# Patient Record
Sex: FEMALE | Race: WHITE
[De-identification: names, ages, dates, MRNs, and addresses within clinical notes are randomized per-mention and may not be internally consistent; named-entity substitution may affect disease eponyms.]

---

## 2018-03-14 ENCOUNTER — HOSPITAL ENCOUNTER (EMERGENCY)
Dept: HOSPITAL 80 - FED | Age: 23
Discharge: HOME | End: 2018-03-14
Payer: COMMERCIAL

## 2018-03-14 VITALS
RESPIRATION RATE: 16 BRPM | SYSTOLIC BLOOD PRESSURE: 105 MMHG | TEMPERATURE: 97.9 F | DIASTOLIC BLOOD PRESSURE: 66 MMHG | OXYGEN SATURATION: 97 % | HEART RATE: 75 BPM

## 2018-03-14 DIAGNOSIS — K52.9: Primary | ICD-10-CM

## 2018-03-14 LAB — PLATELET # BLD: 289 10^3/UL (ref 150–400)

## 2018-03-14 NOTE — EDPHY
H & P


Stated Complaint: bloody diarrhea abd pain x 1 week





- Personal History


LMP (Females 10-55): Extended Cycle BCP/Inj


Current Tetanus/Diphtheria Vaccine: Unsure





- Medical/Surgical History


Hx Asthma: No


Hx Chronic Respiratory Disease: No


Hx Diabetes: No


Hx Cardiac Disease: No


Hx Renal Disease: No


Hx Cirrhosis: No


Hx Alcoholism: No


Hx HIV/AIDS: No


Hx Splenectomy or Spleen Trauma: No


Other PMH: anxiety





- Social History


Smoking Status: Never smoked


Time Seen by Provider: 03/14/18 10:36


HPI/ROS: 





Chief complaint:  Abdominal pain





History of present illness:  This is a 22-year-old female who presents to the 

emergency department for evaluation of abdominal pain.  Patient has had pain 

for the last 10 days.  Pain is primarily in the lower aspect of the abdomen.  

It is been slowly worsening.  She has developed some diarrhea.  It is 

occasionally bloody with bright red blood.  She has had some intermittent 

nausea and vomiting.  She denies precipitating factors.  She denies alleviating 

factors.  She denies other associated signs or symptoms including no fevers no 

urinary symptoms.  She does have an appointment with a gastroenterologist in a 

few weeks.





Review of systems:  A 10 point review of systems was obtained and other than 

described above was negative (Fabricio Morales)





- Physical Exam


Exam: 





General Appearance: Alert, nontoxic.


Eyes: Pupils equal and round no pallor or injection.


ENT, Mouth: Mucous membranes moist.


Respiratory: There are no retractions, lungs are clear to auscultation.


Cardiovascular:  Regular rate and rhythm.


Gastrointestinal:  Bowel sounds are normal.  Abdomen is soft, nondistended.  

There is mild tenderness in the lower quadrants, left greater than right.  No 

peritoneal signs.


Neurological:  Alert and oriented x4.  Strength and sensation intact and 

symmetrical.


Skin: Warm and dry, no rashes.


Musculoskeletal: Neck is supple non tender. Extremities are symmetrical, full 

range of motion.


Psychiatric: Patient is oriented X 3, there is no agitation.


 (Fabricio Morales)


Constitutional: 





 Initial Vital Signs











Temperature (C)  36.6 C   03/14/18 10:07


 


Heart Rate  102 H  03/14/18 10:07


 


Respiratory Rate  20   03/14/18 10:07


 


Blood Pressure  124/87 H  03/14/18 10:07


 


O2 Sat (%)  98   03/14/18 10:07








 











O2 Delivery Mode               Room Air














Allergies/Adverse Reactions: 


 





No Known Allergies Allergy (Unverified 03/14/18 10:04)


 








Home Medications: 














 Medication  Instructions  Recorded


 


Hyoscyamine Oral Liquid  03/14/18


 


Microgestin 21 1.5-30 Tab  03/14/18


 


Ondansetron Odt [Zofran Odt 4 mg 4 mg PO Q4 #10 tab 03/14/18





(*)]  














Medical Decision Making





- Diagnostics


Imaging: Discussed imaging studies w/ On call Radiologist


ED Course/Re-evaluation: 





Patient seen under the supervision of my secondary supervising physician Dr. Radha James.  Patient presents to the emergency department for increasing 

abdominal pain now with occasional bloody stools and nausea and vomiting. On 

presentation she is nontoxic.  Workup ultimately reveals evidence of a colitis.

  I have consulted with on-call gastroenterology, Dr. Hunter.  He does report 

patient is scheduled to see them in their clinic.  He would like to see her 

sooner rather than later and asked that she call the clinic to discuss an 

earlier appointment.  Patient will be discharged home.  Home care is discussed 

including using Zofran for nausea and vomiting.  She is given referral 

information to Dr. Hunter's office.  Strict return precautions are given.  

Patient voiced understanding and agreement with plan. (Fabricio Morales)


Differential Diagnosis: 





Included but not limited to gastritis, gastroenteritis, biliary tract disease, 

pancreatitis, colitis, appendicitis, diverticulitis, pregnancy an associated 

complications (Fabricio Morales)


Other Provider: 





The patient was evaluated and managed by the Physician Assistant.   My co-

signature indicates that I have reviewed this chart and I agree with the 

findings and plan of care as documented.  I am the secondary supervising 

physician. (Radha James)





- Data Points


Laboratory Results: 





 Laboratory Results





 03/14/18 10:45 





 03/14/18 11:46 








Medications Given: 





 








Discontinued Medications





Lorazepam (Ativan Injection)  1 mg IVP EDNOW ONE


   Stop: 03/14/18 11:07


   Last Admin: 03/14/18 11:08 Dose:  1 mg








Departure





- Departure


Disposition: Home, Routine, Self-Care


Clinical Impression: 


 Colitis





Condition: Good


Instructions:  Colitis (ED)


Additional Instructions: 


Please call your gastroenterologist today for continued evaluation and care.  

Please let the office staff know that I talk with Dr. Hunter in the emergency 

room today and he wants to see you as soon as possible.





Use Zofran as directed as needed for nausea and vomiting





If symptoms worsen or new symptoms develop return to the emergency room for 

recheck


Referrals: 


Debby Valladares MD [Primary Care Provider] - As per Instructions


Johnny Hunter MD [Medical Doctor] - As per Instructions


Prescriptions: 


Ondansetron Odt [Zofran Odt 4 mg (*)] 4 mg PO Q4 #10 tab